# Patient Record
Sex: FEMALE | Race: WHITE | NOT HISPANIC OR LATINO | Employment: FULL TIME | URBAN - METROPOLITAN AREA
[De-identification: names, ages, dates, MRNs, and addresses within clinical notes are randomized per-mention and may not be internally consistent; named-entity substitution may affect disease eponyms.]

---

## 2019-09-05 ENCOUNTER — OFFICE VISIT (OUTPATIENT)
Dept: MEDICAL GROUP | Facility: PHYSICIAN GROUP | Age: 59
End: 2019-09-05
Payer: COMMERCIAL

## 2019-09-05 ENCOUNTER — HOSPITAL ENCOUNTER (OUTPATIENT)
Facility: MEDICAL CENTER | Age: 59
End: 2019-09-05
Attending: INTERNAL MEDICINE
Payer: COMMERCIAL

## 2019-09-05 VITALS
SYSTOLIC BLOOD PRESSURE: 132 MMHG | HEART RATE: 80 BPM | TEMPERATURE: 98.7 F | WEIGHT: 181 LBS | OXYGEN SATURATION: 96 % | RESPIRATION RATE: 16 BRPM | DIASTOLIC BLOOD PRESSURE: 64 MMHG

## 2019-09-05 DIAGNOSIS — R30.9 PAINFUL URINATION: ICD-10-CM

## 2019-09-05 LAB
APPEARANCE UR: CLEAR
BILIRUB UR STRIP-MCNC: NORMAL MG/DL
COLOR UR AUTO: YELLOW
GLUCOSE UR STRIP.AUTO-MCNC: NORMAL MG/DL
KETONES UR STRIP.AUTO-MCNC: NORMAL MG/DL
LEUKOCYTE ESTERASE UR QL STRIP.AUTO: NORMAL
NITRITE UR QL STRIP.AUTO: NORMAL
PH UR STRIP.AUTO: 7 [PH] (ref 5–8)
PROT UR QL STRIP: NORMAL MG/DL
RBC UR QL AUTO: NORMAL
SP GR UR STRIP.AUTO: 1.01
UROBILINOGEN UR STRIP-MCNC: 0.2 MG/DL

## 2019-09-05 PROCEDURE — 81002 URINALYSIS NONAUTO W/O SCOPE: CPT | Performed by: INTERNAL MEDICINE

## 2019-09-05 PROCEDURE — 87186 SC STD MICRODIL/AGAR DIL: CPT

## 2019-09-05 PROCEDURE — 99214 OFFICE O/P EST MOD 30 MIN: CPT | Performed by: INTERNAL MEDICINE

## 2019-09-05 PROCEDURE — 87077 CULTURE AEROBIC IDENTIFY: CPT

## 2019-09-05 PROCEDURE — 87086 URINE CULTURE/COLONY COUNT: CPT

## 2019-09-05 RX ORDER — PHENAZOPYRIDINE HYDROCHLORIDE 200 MG/1
200 TABLET, FILM COATED ORAL 3 TIMES DAILY PRN
Qty: 6 TAB | Refills: 0 | Status: SHIPPED | OUTPATIENT
Start: 2019-09-05

## 2019-09-05 RX ORDER — NITROFURANTOIN 25; 75 MG/1; MG/1
100 CAPSULE ORAL 2 TIMES DAILY
Qty: 14 CAP | Refills: 0 | Status: SHIPPED | OUTPATIENT
Start: 2019-09-05

## 2019-09-05 NOTE — ASSESSMENT & PLAN NOTE
She was treated for an ulcer about 2 months ago and then wasn't sure if she developed a yeast infection. Has taken otc yeast infections 3x and fluconazole. She has been having some dysuria starting today when she woke up. Has been flushing fluids aggressively. [place] sick last night but thought it was from the flu shot she got last night. Denies hematuria. Has some urinary pressure and dysuria. Denies fevers, chills, vaginal discharge.

## 2019-09-05 NOTE — PROGRESS NOTES
PRIMARY CARE CLINIC FOLLOW UP VISIT  Chief Complaint   Patient presents with   • Painful Urination     started this morning    • Vaginal Itching     w/ burning x 2 months      History of Present Illness     Painful urination  She was treated for an ulcer about 2 months ago and then wasn't sure if she developed a yeast infection. Has taken otc yeast infections 3x and fluconazole. She has been having some dysuria starting today when she woke up. Has been flushing fluids aggressively. [place] sick last night but thought it was from the flu shot she got last night. Denies hematuria. Has some urinary pressure and dysuria. Denies fevers, chills, vaginal discharge.     Current Outpatient Medications   Medication Sig Dispense Refill   • nitrofurantoin monohyd macro (MACROBID) 100 MG Cap Take 1 Cap by mouth 2 times a day. 14 Cap 0   • phenazopyridine (PYRIDIUM) 200 MG Tab Take 1 Tab by mouth 3 times a day as needed. 6 Tab 0     No current facility-administered medications for this visit.      History reviewed. No pertinent past medical history.  History reviewed. No pertinent surgical history.  Social History     Tobacco Use   • Smoking status: Never Smoker   • Smokeless tobacco: Never Used   Substance Use Topics   • Alcohol use: Not on file   • Drug use: Not on file     Social History     Social History Narrative   • Not on file     History reviewed. No pertinent family history.  No family status information on file.     Allergies: Patient has no known allergies.    ROS  As per HPI above. All other systems reviewed and negative.        Objective   /64   Pulse 80   Temp 37.1 °C (98.7 °F)   Resp 16   Wt 82.1 kg (181 lb)   SpO2 96%  There is no height or weight on file to calculate BMI.    General: alert and oriented, pleasant, cooperative  HEENT: Normocephalic, atraumatic.   Gastrointestinal: no tenderness to palpation. No hepatosplenomegaly. Bowel sounds normoactive  Skin: warm and dry, no lesions or  avery  Psychiatric: appropriate mood and affect. Good insight and appropriate judgment     Assessment and Plan   The following treatment plan was discussed     1. Painful urination  POCT UA suggestive of a UTI, trial of macrobid. Also given pyridium for pain relief. Continue aggressive hydration.   - URINE CULTURE(NEW); Future  - POCT Urinalysis    Return if symptoms worsen or fail to improve.    Bill Vázquez MD  Internal Medicine  [facility]

## 2019-09-07 LAB
BACTERIA UR CULT: ABNORMAL
BACTERIA UR CULT: ABNORMAL
SIGNIFICANT IND 70042: ABNORMAL
SITE SITE: ABNORMAL
SOURCE SOURCE: ABNORMAL

## 2019-09-09 ENCOUNTER — TELEPHONE (OUTPATIENT)
Dept: MEDICAL GROUP | Facility: PHYSICIAN GROUP | Age: 59
End: 2019-09-09

## 2019-09-09 NOTE — TELEPHONE ENCOUNTER
----- Message from Bill Vázquez M.D. sent at 9/9/2019 11:47 AM PDT -----  Please call Aury and let her know her urine culture grew out E coli, which is commonly responsible for UTIs. The antibiotics she was prescribed are effective in treating this particular organism

## 2019-09-10 NOTE — TELEPHONE ENCOUNTER
2nd Attempt   Phone Number Called: [phone] (home)      Call outcome: left message for patient to call back regarding message below

## 2021-01-06 DIAGNOSIS — Z23 NEED FOR VACCINATION: ICD-10-CM
